# Patient Record
Sex: FEMALE | Race: BLACK OR AFRICAN AMERICAN | NOT HISPANIC OR LATINO
[De-identification: names, ages, dates, MRNs, and addresses within clinical notes are randomized per-mention and may not be internally consistent; named-entity substitution may affect disease eponyms.]

---
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
## 2025-04-30 NOTE — H&P PST PEDIATRIC - NEURO
Detail Level: Zone Affect appropriate/Normal unassisted gait/Deep tendon reflexes intact and symmetric/Sensation intact to touch/Interactive/Verbalization clear and understandable for age/Motor strength normal in all extremities